# Patient Record
Sex: MALE | Race: BLACK OR AFRICAN AMERICAN
[De-identification: names, ages, dates, MRNs, and addresses within clinical notes are randomized per-mention and may not be internally consistent; named-entity substitution may affect disease eponyms.]

---

## 2021-01-25 ENCOUNTER — DASHBOARD ENCOUNTERS (OUTPATIENT)
Age: 31
End: 2021-01-25

## 2021-01-25 ENCOUNTER — OFFICE VISIT (OUTPATIENT)
Dept: URBAN - METROPOLITAN AREA CLINIC 92 | Facility: CLINIC | Age: 31
End: 2021-01-25
Payer: COMMERCIAL

## 2021-01-25 DIAGNOSIS — K59.00 CONSTIPATION: ICD-10-CM

## 2021-01-25 DIAGNOSIS — R14.0 BLOATING: ICD-10-CM

## 2021-01-25 DIAGNOSIS — K92.1 BLOOD IN STOOL: ICD-10-CM

## 2021-01-25 PROBLEM — 14760008 CONSTIPATION: Status: ACTIVE | Noted: 2021-01-25

## 2021-01-25 PROCEDURE — 99203 OFFICE O/P NEW LOW 30 MIN: CPT | Performed by: INTERNAL MEDICINE

## 2021-01-25 PROCEDURE — G8482 FLU IMMUNIZE ORDER/ADMIN: HCPCS | Performed by: INTERNAL MEDICINE

## 2021-01-25 PROCEDURE — 1036F TOBACCO NON-USER: CPT | Performed by: INTERNAL MEDICINE

## 2021-01-25 PROCEDURE — G8427 DOCREV CUR MEDS BY ELIG CLIN: HCPCS | Performed by: INTERNAL MEDICINE

## 2021-01-25 PROCEDURE — G8420 CALC BMI NORM PARAMETERS: HCPCS | Performed by: INTERNAL MEDICINE

## 2021-01-25 NOTE — HPI-TODAY'S VISIT:
This is a 30-year-old -American male presents today for initial evaluation.  He notes that he has longstanding constipation.  He will only have 1-2 bowel movements per week.  He is not on a bowel regimen.  He also notes intermittent rectal bleeding with both blood on the tissue paper as well as in the toilet.  He does have intermittent abdominal pain gas bloating.  As well as rectal or anal pain with defecation.  His weight is stable.  There is no family history of inflammatory bowel disease.  There is no family history of colon cancer.

## 2021-01-29 ENCOUNTER — OFFICE VISIT (OUTPATIENT)
Dept: URBAN - METROPOLITAN AREA SURGERY CENTER 16 | Facility: SURGERY CENTER | Age: 31
End: 2021-01-29
Payer: COMMERCIAL

## 2021-01-29 DIAGNOSIS — K62.5 ANAL BLEEDING: ICD-10-CM

## 2021-01-29 PROCEDURE — 45378 DIAGNOSTIC COLONOSCOPY: CPT | Performed by: INTERNAL MEDICINE

## 2021-01-29 PROCEDURE — G8907 PT DOC NO EVENTS ON DISCHARG: HCPCS | Performed by: INTERNAL MEDICINE

## 2024-07-15 ENCOUNTER — OFFICE VISIT (OUTPATIENT)
Dept: URBAN - METROPOLITAN AREA CLINIC 92 | Facility: CLINIC | Age: 34
End: 2024-07-15
Payer: COMMERCIAL

## 2024-07-15 VITALS — WEIGHT: 136 LBS | TEMPERATURE: 97.2 F | HEIGHT: 68 IN | BODY MASS INDEX: 20.61 KG/M2

## 2024-07-15 DIAGNOSIS — R10.33 PERIUMBILICAL ABDOMINAL PAIN: ICD-10-CM

## 2024-07-15 PROCEDURE — 99204 OFFICE O/P NEW MOD 45 MIN: CPT | Performed by: INTERNAL MEDICINE

## 2024-07-15 NOTE — HPI-TODAY'S VISIT:
This is a 33-year-old male who presents for evaluation of abdominal pain.  Patient reports having 1 week history of umbilical abdominal pain characterized as a discomfort that is triggered by applying pressure to the abdomen.  The symptoms are intermittent.  Otherwise, he is asymptomatic and denies any abdominal pain, N/V, hematemesis, melena, or hematochezia.  There has no fever, chills, or constitutional symptoms including unintentional weight loss.  Because of his abdominal pain, he was seen at an urgent care clinic last week and was given Toradol and discharged to follow-up with a gastroenterologist as an outpatient.    Patient denies any ASA or NSAID use.  He has frequent migraine headache which she uses Tylenol.  He admits to occasional alcohol use.

## 2024-07-31 ENCOUNTER — TELEPHONE ENCOUNTER (OUTPATIENT)
Dept: URBAN - METROPOLITAN AREA CLINIC 92 | Facility: CLINIC | Age: 34
End: 2024-07-31